# Patient Record
(demographics unavailable — no encounter records)

---

## 2025-05-29 NOTE — PHYSICAL EXAM

## 2025-05-29 NOTE — DISCUSSION/SUMMARY
[FreeTextEntry1] : 6 yo ex 30week premie, motor and fine motor delays approved for therapy, Father does not want private therapies mild pectus excavatum, observe and follow low BMI 3%, discussed adding healthy fat to all foods labs drawn Continue balanced diet with all food groups. Brush teeth twice a day with toothbrush. Recommend visit to dentist. As per car seat 's guidelines, use forward-facing booster seat until child reaches highest weight/height for seat. Child needs to ride in a belt-positioning booster seat until  4 feet 9 inches has been reached and are between 8 and 12 years of age. Put child to sleep in own bed. Help child to maintain consistent daily routines and sleep schedule.  discussed. Ensure home is safe. Teach child about personal safety. Use consistent, positive discipline. Read aloud to child. Limit screen time to no more than 2 hours per day. Return 1 year for routine well child check.

## 2025-05-29 NOTE — HISTORY OF PRESENT ILLNESS
[Father] : father [whole ___ oz/d] : consumes [unfilled] oz of whole cow's milk per day [Fruit] : fruit [Vegetables] : vegetables [Meat] : meat [Grains] : grains [Eggs] : eggs [Fish] : fish [Dairy] : dairy [Normal] : Normal [Brushing teeth] : Brushing teeth [Yes] : Patient goes to dentist yearly [In Pre-K] : In Pre-K

## 2025-05-29 NOTE — DEVELOPMENTAL MILESTONES
[Dresses and undresses without help] : dresses and undresses without help [Goes to the bathroom independently] : goes to bathroom independently [Is dry through the day] :  is dry through the day [Tells a story of 2 sentences or more] : tells a story of 2 sentences or more [Walks on tiptoes when asked] : walks on tiptoes when asked [Catches a bounced ball with] : catches a bounced ball with 2 hands [Copies first name] : copies first name [FreeTextEntry1] : supposed to get PT, OT, has not been receiving [Yes] : Completed.